# Patient Record
Sex: MALE | Race: WHITE
[De-identification: names, ages, dates, MRNs, and addresses within clinical notes are randomized per-mention and may not be internally consistent; named-entity substitution may affect disease eponyms.]

---

## 2019-02-20 ENCOUNTER — HOSPITAL ENCOUNTER (EMERGENCY)
Dept: HOSPITAL 26 - MED | Age: 54
Discharge: HOME | End: 2019-02-20
Payer: MEDICAID

## 2019-02-20 VITALS — DIASTOLIC BLOOD PRESSURE: 103 MMHG | SYSTOLIC BLOOD PRESSURE: 149 MMHG

## 2019-02-20 VITALS — BODY MASS INDEX: 23.78 KG/M2 | WEIGHT: 148 LBS | HEIGHT: 66 IN

## 2019-02-20 VITALS — DIASTOLIC BLOOD PRESSURE: 85 MMHG | SYSTOLIC BLOOD PRESSURE: 157 MMHG

## 2019-02-20 DIAGNOSIS — R10.84: ICD-10-CM

## 2019-02-20 DIAGNOSIS — F10.129: Primary | ICD-10-CM

## 2019-02-20 LAB
ALBUMIN FLD-MCNC: 4.8 G/DL (ref 3.4–5)
ANION GAP SERPL CALCULATED.3IONS-SCNC: 18.5 MMOL/L (ref 8–16)
AST SERPL-CCNC: 147 U/L (ref 15–37)
BARBITURATES UR QL SCN: (no result) NG/ML
BASOPHILS # BLD AUTO: 0.1 K/UL (ref 0–0.22)
BASOPHILS NFR BLD AUTO: 1.2 % (ref 0–2)
BENZODIAZ UR QL SCN: (no result) NG/ML
BILIRUB SERPL-MCNC: 0.8 MG/DL (ref 0–1)
BUN SERPL-MCNC: 7 MG/DL (ref 7–18)
BZE UR QL SCN: (no result) NG/ML
CANNABINOIDS UR QL SCN: (no result) NG/ML
CHLORIDE SERPL-SCNC: 99 MMOL/L (ref 98–107)
CO2 SERPL-SCNC: 28.1 MMOL/L (ref 21–32)
CREAT SERPL-MCNC: 0.8 MG/DL (ref 0.7–1.3)
EOSINOPHIL # BLD AUTO: 0 K/UL (ref 0–0.4)
EOSINOPHIL NFR BLD AUTO: 0.2 % (ref 0–4)
ERYTHROCYTE [DISTWIDTH] IN BLOOD BY AUTOMATED COUNT: 14.7 % (ref 11.6–13.7)
GFR SERPL CREATININE-BSD FRML MDRD: 129 ML/MIN (ref 90–?)
GLUCOSE SERPL-MCNC: 111 MG/DL (ref 74–106)
HCT VFR BLD AUTO: 50.9 % (ref 36–52)
HGB BLD-MCNC: 17.1 G/DL (ref 12–18)
LIPASE SERPL-CCNC: 229 U/L (ref 73–393)
LYMPHOCYTES # BLD AUTO: 0.9 K/UL (ref 2–11.5)
LYMPHOCYTES NFR BLD AUTO: 19.2 % (ref 20.5–51.1)
MCH RBC QN AUTO: 31 PG (ref 27–31)
MCHC RBC AUTO-ENTMCNC: 34 G/DL (ref 33–37)
MCV RBC AUTO: 92.9 FL (ref 80–94)
MONOCYTES # BLD AUTO: 0.4 K/UL (ref 0.8–1)
MONOCYTES NFR BLD AUTO: 8.3 % (ref 1.7–9.3)
NEUTROPHILS # BLD AUTO: 3.4 K/UL (ref 1.8–7.7)
NEUTROPHILS NFR BLD AUTO: 71.1 % (ref 42.2–75.2)
OPIATES UR QL SCN: (no result) NG/ML
PCP UR QL SCN: (no result) NG/ML
PLATELET # BLD AUTO: 163 K/UL (ref 140–450)
POTASSIUM SERPL-SCNC: 3.6 MMOL/L (ref 3.5–5.1)
RBC # BLD AUTO: 5.48 MIL/UL (ref 4.2–6.1)
SODIUM SERPL-SCNC: 142 MMOL/L (ref 136–145)
WBC # BLD AUTO: 4.8 K/UL (ref 4.8–10.8)

## 2019-02-20 PROCEDURE — G0482 DRUG TEST DEF 15-21 CLASSES: HCPCS

## 2019-02-20 PROCEDURE — 83690 ASSAY OF LIPASE: CPT

## 2019-02-20 PROCEDURE — 36415 COLL VENOUS BLD VENIPUNCTURE: CPT

## 2019-02-20 PROCEDURE — 99283 EMERGENCY DEPT VISIT LOW MDM: CPT

## 2019-02-20 PROCEDURE — 85025 COMPLETE CBC W/AUTO DIFF WBC: CPT

## 2019-02-20 PROCEDURE — 80053 COMPREHEN METABOLIC PANEL: CPT

## 2019-02-20 PROCEDURE — 80305 DRUG TEST PRSMV DIR OPT OBS: CPT

## 2019-02-20 NOTE — NUR
53/M BIB SON FOR ETOH INTOXICATION. PT HAS BEEN DRINKING 3-4 BEERS FOR 2 WEEKS. 
PT REPORTS 4/10 DIFFUSE ABD PAIN. DENIES FEVER, NAUSEA, VOMITING, DIARRHEA, 
CONSTIPATION. PT AOX4, GCS 14, WITH MILD CONFUSION, AMBULATORY, RR EVEN AND 
UNLABORED. LUNG SOUNDS CLEAR BL. BS ACTIVE X4, ABD SOFT ROUND NONTENDER.

## 2019-03-03 ENCOUNTER — HOSPITAL ENCOUNTER (EMERGENCY)
Dept: HOSPITAL 1 - ED | Age: 54
LOS: 1 days | Discharge: HOME | End: 2019-03-04
Payer: MEDICAID

## 2019-03-03 VITALS
HEIGHT: 66 IN | HEIGHT: 66 IN | BODY MASS INDEX: 31.82 KG/M2 | WEIGHT: 198 LBS | WEIGHT: 198 LBS | BODY MASS INDEX: 31.82 KG/M2

## 2019-03-03 DIAGNOSIS — F10.129: Primary | ICD-10-CM

## 2019-03-03 DIAGNOSIS — R63.0: ICD-10-CM

## 2019-03-03 LAB
ALBUMIN SERPL-MCNC: 4.4 G/DL (ref 3.4–5)
ALP SERPL-CCNC: 100 U/L (ref 46–116)
ALT SERPL-CCNC: 128 U/L (ref 16–63)
AST SERPL-CCNC: 194 U/L (ref 15–37)
BASOPHILS NFR BLD: 0.7 % (ref 0–2)
BILIRUB SERPL-MCNC: 1.03 MG/DL (ref 0.2–1)
BUN SERPL-MCNC: 6 MG/DL (ref 7–18)
CALCIUM SERPL-MCNC: 8.1 MG/DL (ref 8.5–10.1)
CHLORIDE SERPL-SCNC: 99 MMOL/L (ref 98–107)
CO2 SERPL-SCNC: 31.9 MMOL/L (ref 21–32)
CREAT SERPL-MCNC: 0.8 MG/DL (ref 0.7–1.3)
ERYTHROCYTE [DISTWIDTH] IN BLOOD BY AUTOMATED COUNT: 15.5 % (ref 11.5–14.5)
GFR SERPLBLD BASED ON 1.73 SQ M-ARVRAT: > 60 ML/MIN
GLUCOSE SERPL-MCNC: 113 MG/DL (ref 74–106)
PLATELET # BLD: 152 X10^3MCL (ref 130–400)
POTASSIUM SERPL-SCNC: 3.4 MMOL/L (ref 3.5–5.1)
PROT SERPL-MCNC: 8.9 G/DL (ref 6.4–8.2)
SODIUM SERPL-SCNC: 142 MMOL/L (ref 136–145)

## 2019-03-03 PROCEDURE — G0480 DRUG TEST DEF 1-7 CLASSES: HCPCS

## 2019-03-04 VITALS — SYSTOLIC BLOOD PRESSURE: 115 MMHG | DIASTOLIC BLOOD PRESSURE: 82 MMHG

## 2019-03-07 ENCOUNTER — HOSPITAL ENCOUNTER (INPATIENT)
Dept: HOSPITAL 26 - MED | Age: 54
LOS: 4 days | Discharge: HOME | End: 2019-03-11
Attending: GENERAL PRACTICE | Admitting: GENERAL PRACTICE
Payer: MEDICAID

## 2019-03-07 VITALS — DIASTOLIC BLOOD PRESSURE: 81 MMHG | SYSTOLIC BLOOD PRESSURE: 143 MMHG

## 2019-03-07 VITALS — WEIGHT: 147 LBS | BODY MASS INDEX: 23.07 KG/M2 | HEIGHT: 67 IN

## 2019-03-07 DIAGNOSIS — E78.5: ICD-10-CM

## 2019-03-07 DIAGNOSIS — E78.00: ICD-10-CM

## 2019-03-07 DIAGNOSIS — M19.011: ICD-10-CM

## 2019-03-07 DIAGNOSIS — F10.129: Primary | ICD-10-CM

## 2019-03-07 DIAGNOSIS — D72.819: ICD-10-CM

## 2019-03-07 DIAGNOSIS — M19.012: ICD-10-CM

## 2019-03-07 DIAGNOSIS — K80.20: ICD-10-CM

## 2019-03-07 DIAGNOSIS — R79.89: ICD-10-CM

## 2019-03-07 DIAGNOSIS — Z71.41: ICD-10-CM

## 2019-03-07 DIAGNOSIS — K85.90: ICD-10-CM

## 2019-03-07 DIAGNOSIS — E87.6: ICD-10-CM

## 2019-03-07 DIAGNOSIS — K76.9: ICD-10-CM
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
## 2019-03-07 DIAGNOSIS — G92: ICD-10-CM

## 2019-03-07 DIAGNOSIS — E83.51: ICD-10-CM

## 2019-03-07 LAB
ALBUMIN FLD-MCNC: 3.9 G/DL (ref 3.4–5)
ANION GAP SERPL CALCULATED.3IONS-SCNC: 21.7 MMOL/L (ref 8–16)
APAP SERPL-MCNC: < 0.5 UG/ML (ref 10–30)
AST SERPL-CCNC: 212 U/L (ref 15–37)
BASOPHILS # BLD AUTO: 0.1 K/UL (ref 0–0.22)
BASOPHILS NFR BLD AUTO: 2.1 % (ref 0–2)
BILIRUB SERPL-MCNC: 1.5 MG/DL (ref 0–1)
BUN SERPL-MCNC: 17 MG/DL (ref 7–18)
CHLORIDE SERPL-SCNC: 98 MMOL/L (ref 98–107)
CO2 SERPL-SCNC: 24.6 MMOL/L (ref 21–32)
CREAT SERPL-MCNC: 0.8 MG/DL (ref 0.7–1.3)
EOSINOPHIL # BLD AUTO: 0 K/UL (ref 0–0.4)
EOSINOPHIL NFR BLD AUTO: 0.2 % (ref 0–4)
ERYTHROCYTE [DISTWIDTH] IN BLOOD BY AUTOMATED COUNT: 15.5 % (ref 11.6–13.7)
GFR SERPL CREATININE-BSD FRML MDRD: 129 ML/MIN (ref 90–?)
GLUCOSE SERPL-MCNC: 81 MG/DL (ref 74–106)
HCT VFR BLD AUTO: 46.5 % (ref 36–52)
HGB BLD-MCNC: 15.9 G/DL (ref 12–18)
LYMPHOCYTES # BLD AUTO: 0.8 K/UL (ref 2–11.5)
LYMPHOCYTES NFR BLD AUTO: 24.9 % (ref 20.5–51.1)
MCH RBC QN AUTO: 32 PG (ref 27–31)
MCHC RBC AUTO-ENTMCNC: 34 G/DL (ref 33–37)
MCV RBC AUTO: 93.4 FL (ref 80–94)
MONOCYTES # BLD AUTO: 0.4 K/UL (ref 0.8–1)
MONOCYTES NFR BLD AUTO: 13.3 % (ref 1.7–9.3)
NEUTROPHILS # BLD AUTO: 1.8 K/UL (ref 1.8–7.7)
NEUTROPHILS NFR BLD AUTO: 59.5 % (ref 42.2–75.2)
PLATELET # BLD AUTO: 128 K/UL (ref 140–450)
POTASSIUM SERPL-SCNC: 3.3 MMOL/L (ref 3.5–5.1)
RBC # BLD AUTO: 4.98 MIL/UL (ref 4.2–6.1)
SALICYLATES SERPL-MCNC: < 2.8 MG/DL (ref 2.8–20)
SODIUM SERPL-SCNC: 141 MMOL/L (ref 136–145)
WBC # BLD AUTO: 3.1 K/UL (ref 4.8–10.8)

## 2019-03-07 PROCEDURE — G0480 DRUG TEST DEF 1-7 CLASSES: HCPCS

## 2019-03-07 PROCEDURE — G0482 DRUG TEST DEF 15-21 CLASSES: HCPCS

## 2019-03-07 PROCEDURE — A9153 MULTI-VITAMIN NOS: HCPCS

## 2019-03-07 NOTE — NUR
PATIENT PRESENTS TO ED WITH UNDER THE INFLUENCE OF [place] SUBSTANCE. AAO X2 TO 
NAME AND BIRTHDATE.DENIES N/V/D; SKIN IS PINK/WARM/DRY;LUNGS CLEAR BL; HR EVEN 
AND REGULAR; PT DENIES ANY FEVER, CP, SOB, OR COUGH AT THIS TIME; PATIENT 
STATES PAIN OF 0/10 AT THIS TIME; VSS; PATIENT POSITIONED FOR COMFORT; HOB 
ELEVATED; BEDRAILS UP X2; BED DOWN. ER MD MADE AWARE OF PT STATUS.

## 2019-03-08 VITALS — DIASTOLIC BLOOD PRESSURE: 84 MMHG | SYSTOLIC BLOOD PRESSURE: 141 MMHG

## 2019-03-08 VITALS — SYSTOLIC BLOOD PRESSURE: 143 MMHG | DIASTOLIC BLOOD PRESSURE: 83 MMHG

## 2019-03-08 VITALS — DIASTOLIC BLOOD PRESSURE: 79 MMHG | SYSTOLIC BLOOD PRESSURE: 132 MMHG

## 2019-03-08 LAB
AMYLASE SERPL-CCNC: 153 U/L (ref 25–115)
ANION GAP SERPL CALCULATED.3IONS-SCNC: 23.8 MMOL/L (ref 8–16)
APPEARANCE UR: CLEAR
BARBITURATES UR QL SCN: (no result) NG/ML
BASOPHILS # BLD AUTO: 0.1 K/UL (ref 0–0.22)
BASOPHILS NFR BLD AUTO: 2.3 % (ref 0–2)
BENZODIAZ UR QL SCN: (no result) NG/ML
BILIRUB UR QL STRIP: (no result)
BUN SERPL-MCNC: 15 MG/DL (ref 7–18)
BZE UR QL SCN: (no result) NG/ML
CANNABINOIDS UR QL SCN: (no result) NG/ML
CHLORIDE SERPL-SCNC: 101 MMOL/L (ref 98–107)
CHOLEST/HDLC SERPL: 2.3 {RATIO} (ref 1–4.5)
CO2 SERPL-SCNC: 21.7 MMOL/L (ref 21–32)
COLOR UR: (no result)
CREAT SERPL-MCNC: 0.7 MG/DL (ref 0.7–1.3)
EOSINOPHIL # BLD AUTO: 0 K/UL (ref 0–0.4)
EOSINOPHIL NFR BLD AUTO: 0.2 % (ref 0–4)
ERYTHROCYTE [DISTWIDTH] IN BLOOD BY AUTOMATED COUNT: 16 % (ref 11.6–13.7)
GFR SERPL CREATININE-BSD FRML MDRD: 151 ML/MIN (ref 90–?)
GLUCOSE SERPL-MCNC: 72 MG/DL (ref 74–106)
GLUCOSE UR STRIP-MCNC: (no result) MG/DL
HCT VFR BLD AUTO: 44 % (ref 36–52)
HDLC SERPL-MCNC: 115 MG/DL (ref 40–60)
HGB BLD-MCNC: 15 G/DL (ref 12–18)
HGB UR QL STRIP: (no result)
LDLC SERPL CALC-MCNC: 123 MG/DL (ref 60–100)
LEUKOCYTE ESTERASE UR QL STRIP: NEGATIVE
LIPASE SERPL-CCNC: 798 U/L (ref 73–393)
LYMPHOCYTES # BLD AUTO: 0.5 K/UL (ref 2–11.5)
LYMPHOCYTES NFR BLD AUTO: 14.9 % (ref 20.5–51.1)
MAGNESIUM SERPL-MCNC: 2 MG/DL (ref 1.8–2.4)
MAGNESIUM SERPL-MCNC: 2.1 MG/DL (ref 1.8–2.4)
MCH RBC QN AUTO: 32 PG (ref 27–31)
MCHC RBC AUTO-ENTMCNC: 34 G/DL (ref 33–37)
MCV RBC AUTO: 93.1 FL (ref 80–94)
MONOCYTES # BLD AUTO: 0.3 K/UL (ref 0.8–1)
MONOCYTES NFR BLD AUTO: 10.6 % (ref 1.7–9.3)
NEUTROPHILS # BLD AUTO: 2.2 K/UL (ref 1.8–7.7)
NEUTROPHILS NFR BLD AUTO: 72 % (ref 42.2–75.2)
NITRITE UR QL STRIP: NEGATIVE
OPIATES UR QL SCN: (no result) NG/ML
PCP UR QL SCN: (no result) NG/ML
PH UR STRIP: 6 [PH] (ref 5–9)
PHOSPHATE SERPL-MCNC: 4.1 MG/DL (ref 2.5–4.9)
PHOSPHATE SERPL-MCNC: 4.2 MG/DL (ref 2.5–4.9)
PLATELET # BLD AUTO: 124 K/UL (ref 140–450)
POTASSIUM SERPL-SCNC: 3.5 MMOL/L (ref 3.5–5.1)
PROTHROMBIN TIME: 10.1 SECS (ref 10.8–13.4)
RBC # BLD AUTO: 4.73 MIL/UL (ref 4.2–6.1)
RBC #/AREA URNS HPF: (no result) /HPF (ref 0–5)
SODIUM SERPL-SCNC: 143 MMOL/L (ref 136–145)
TRIGL SERPL-MCNC: 108 MG/DL (ref 30–150)
TSH SERPL DL<=0.05 MIU/L-ACNC: 0.58 UIU/ML (ref 0.34–3.74)
WBC # BLD AUTO: 3 K/UL (ref 4.8–10.8)
WBC,URINE: (no result) /HPF (ref 0–5)

## 2019-03-08 RX ADMIN — SODIUM CHLORIDE PRN MG: 9 INJECTION, SOLUTION INTRAVENOUS at 13:07

## 2019-03-08 RX ADMIN — SODIUM CHLORIDE SCH MLS/HR: 9 INJECTION, SOLUTION INTRAVENOUS at 15:04

## 2019-03-08 NOTE — NUR
ASSISTED CNA AND PROVIDED A SPONGE BAHT TO PATIENT. INSTRUCTED PT TO USE THE BEDSIDE URANAL 
AND PRESS THE CALL LIGHT FOR ANY ASSISTANCE. SAFETY MEASURES IN PLACE.

## 2019-03-08 NOTE — NUR
RECEIVED REPORT FROM LIZBETH RN NIGHT SHIFT NURSE FROM ER. PT BROUGHT UP BY AJITH, AND PT WAS 
TRANSFERRED TO ROOM 124A. HE IS  AOX2 AND Congolese SPEAKING ONLY. PT REPORTS NO OTHER MEDICAL 
HX EXCEPT ETOH ABUSE AND SEIZURES. PT SAID THAT HE HAD BEEN DRINKING 3-4 LARGE 24OZ BEERS X 
2 WEEKS. V/S AS FOLLOWS T 97.4 P 74 R 18 B/P 124/68 02 93% ON ROOM AIR.

## 2019-03-08 NOTE — NUR
DR. DIXON EVALUATING PT AT BEDSIDE. NEW ORDERED NOTED FOR N/S TO RUNN AT 100MLS/HR. IN 
PROGRESS. PT HAS R AC 20GUAGE, FLUSHED PATENET. AND RUNNING NS AS ORDERED. PT IN LOW BED 
WITH SIDE RAILS UP X2 HE IS ALERT TO NAME, AND HAS NO FURTHER C/O VOICED. CALL BELL IN 
REACH. PT ALSO HAS SEIZURE PRECAUTIONS IN PLACE.

## 2019-03-08 NOTE — NUR
PT IS EATING DINNER. ABLE TO SELF-FEED. MINIMAL TREMOR NOTED. DENIES OF PAIN. NO SIGNS OF 
DISTRESS NOTED.

## 2019-03-08 NOTE — NUR
ENDORSED PATIENT AT BEDSIDE TO NIGHT SHIFT NURSE FOR CONTINUITY OF CARE. PT IS IN STABLE 
CONDITION.

## 2019-03-08 NOTE — NUR
PT HAS ORDER FOR BANANA BAG.WE HAVE NO [facility].I CHECKED [facility] UNIT AND ICU AND 
ER.SO CALLED DR BLOOM RESIDENT AND ASKED HIM DO YOU WANT TO MIX BANANA BAG WITHOUT THIAMIN OR 
DO YOU WANT TO WAIT UNTIL AM THAT PHARMACY MAKE IT.HE SAID WAIT UNTIL AM.INFORMED KARI LANZA 
PT'S NURSE.

-------------------------------------------------------------------------------

Addendum: 03/11/19 at 2034 by Haley Cole RN

-------------------------------------------------------------------------------

PLEASE AMEND PT HAS ORDER 
FOR....................................................................HE SAID WAIT UNTIL 
AM.

## 2019-03-08 NOTE — NUR
SEIZURES/TREMORS NOTED WHILE WITH BEDPAN IN THE BED. PT COULD NOT LIFT HIS PELVIS UP. 
UNCOORDINATED MVTS. GIVEN ATIVAN AS ORDERED PRN

## 2019-03-08 NOTE — NUR
Patient will be admitted to care of DR MEZA. Admited to [facility].  Will go to room 
124A. Belongings list completed.  Report to KARI LANZA .

## 2019-03-08 NOTE — NUR
RECEIVED BEDSIDE REPORT FROM AM SHIFT NURSE. PT IS AOX2, TO NAME AND PLACE.DX: ALCOHOL 
INTOXICATION. DENIES PAIN. ABLE TO MAKE NEED KNOWN AND FOLLOW SIMPLE COMMANDS. ABLE TO 
SELF-REPOSITIONING. RESPIRATION IS EVEN AND UNLABORED. ON RA. NO SIGNS OF DISTRESS NOTED. IV 
ON RAC 20G, PATENT AND ASYMPTOMATIC, INFUSING PER MD ORDER. SKIN INTACT AND DRY. UNABLE TO 
AMBULATE WITH STEADY GAIT. URINAL IS AT BEDSIDE. DISCUSSED PLAN OF CARE WITH PT. FALL 
PRECAUTIONS AND SEIZURE PRECAUTION INITIALED. BED IN LOW POSITION, CALL LIGHT WITHIN REACH.

## 2019-03-08 NOTE — NUR
PT IS FEELING RESTLESS AND IRRITABLE.  MINIMAL TREMOR NOTED ON BOTH HANDS. ADMINISTERED PRN 
ATIVAN AS PER MD ORDER.

## 2019-03-08 NOTE — NUR
RECEIVED BEDSIDE REPORT FROM NIGHT SHIFT NURSE. PT IS AOX2, TO NAME AND PLACE. DENIES PAIN. 
ABLE TO MAKE NEED KNOWN AND FOLLOW SIMPLE COMMANDS. ABLE TO SELF-REPOSITIONING. RESPIRATION 
IS EVEN AND UNLABORED. RA. NO SIGNS OF DISTRESS NOTED. IV ON RAC 20G, PATENT AND 
ASYMPTOMATIC, INFUSING PER MD ORDER. SKIN INTACT AND DRY. UNABLE TO AMBULATE WITH STEADY 
GAIT. URANAL IS AT BEDSIDE. DISCUSSED PLAN OF CARE WITH PT, PT NODDED HIS HEAD. FALL 
PRECAUTIONS AND SEIZURE PRECAUTION INITIALED. BED IN LOW POSITION, CALL LIGHT WITHIN REACH.

## 2019-03-09 VITALS — SYSTOLIC BLOOD PRESSURE: 141 MMHG | DIASTOLIC BLOOD PRESSURE: 84 MMHG

## 2019-03-09 VITALS — SYSTOLIC BLOOD PRESSURE: 153 MMHG | DIASTOLIC BLOOD PRESSURE: 80 MMHG

## 2019-03-09 LAB
ANION GAP SERPL CALCULATED.3IONS-SCNC: 17.7 MMOL/L (ref 8–16)
BASOPHILS # BLD AUTO: 0.1 K/UL (ref 0–0.22)
BASOPHILS NFR BLD AUTO: 1 % (ref 0–2)
BUN SERPL-MCNC: 9 MG/DL (ref 7–18)
CHLORIDE SERPL-SCNC: 97 MMOL/L (ref 98–107)
CHOLEST/HDLC SERPL: 2 {RATIO} (ref 1–4.5)
CO2 SERPL-SCNC: 26.4 MMOL/L (ref 21–32)
CREAT SERPL-MCNC: 0.8 MG/DL (ref 0.7–1.3)
EOSINOPHIL # BLD AUTO: 0 K/UL (ref 0–0.4)
EOSINOPHIL NFR BLD AUTO: 0.8 % (ref 0–4)
ERYTHROCYTE [DISTWIDTH] IN BLOOD BY AUTOMATED COUNT: 15.8 % (ref 11.6–13.7)
GFR SERPL CREATININE-BSD FRML MDRD: 129 ML/MIN (ref 90–?)
GLUCOSE SERPL-MCNC: 82 MG/DL (ref 74–106)
HCT VFR BLD AUTO: 39 % (ref 36–52)
HDLC SERPL-MCNC: 105 MG/DL (ref 40–60)
HGB BLD-MCNC: 13.3 G/DL (ref 12–18)
LDLC SERPL CALC-MCNC: 93 MG/DL (ref 60–100)
LYMPHOCYTES # BLD AUTO: 0.6 K/UL (ref 2–11.5)
LYMPHOCYTES NFR BLD AUTO: 11.1 % (ref 20.5–51.1)
MCH RBC QN AUTO: 32 PG (ref 27–31)
MCHC RBC AUTO-ENTMCNC: 34 G/DL (ref 33–37)
MCV RBC AUTO: 93.7 FL (ref 80–94)
MONOCYTES # BLD AUTO: 0.5 K/UL (ref 0.8–1)
MONOCYTES NFR BLD AUTO: 9.4 % (ref 1.7–9.3)
NEUTROPHILS # BLD AUTO: 4.2 K/UL (ref 1.8–7.7)
NEUTROPHILS NFR BLD AUTO: 77.7 % (ref 42.2–75.2)
PLATELET # BLD AUTO: 104 K/UL (ref 140–450)
POTASSIUM SERPL-SCNC: 3.1 MMOL/L (ref 3.5–5.1)
RBC # BLD AUTO: 4.16 MIL/UL (ref 4.2–6.1)
SODIUM SERPL-SCNC: 138 MMOL/L (ref 136–145)
TRIGL SERPL-MCNC: 40 MG/DL (ref 30–150)
WBC # BLD AUTO: 5.4 K/UL (ref 4.8–10.8)

## 2019-03-09 RX ADMIN — SODIUM CHLORIDE SCH MLS/HR: 9 INJECTION, SOLUTION INTRAVENOUS at 20:40

## 2019-03-09 RX ADMIN — HYDROCODONE BITARTRATE AND ACETAMINOPHEN PRN TAB: 7.5; 325 TABLET ORAL at 20:53

## 2019-03-09 RX ADMIN — SODIUM CHLORIDE SCH MLS/HR: 9 INJECTION, SOLUTION INTRAVENOUS at 00:40

## 2019-03-09 RX ADMIN — SODIUM CHLORIDE SCH MLS/HR: 9 INJECTION, SOLUTION INTRAVENOUS at 17:33

## 2019-03-09 RX ADMIN — SODIUM CHLORIDE PRN MG: 9 INJECTION, SOLUTION INTRAVENOUS at 17:39

## 2019-03-09 RX ADMIN — ATORVASTATIN CALCIUM SCH MG: 20 TABLET, FILM COATED ORAL at 17:32

## 2019-03-09 NOTE — NUR
RECEIVED BEDSIDE REPORT FROM NIGHT SHIFT NURSE. PT IS AOX2, TO NAME AND PLACE. DENIES PAIN. 
ABLE TO MAKE NEED KNOWN AND FOLLOW SIMPLE COMMANDS. ABLE TO SELF-REPOSITIONING. RESPIRATION 
IS EVEN AND UNLABORED. RA. MINIMAL TREMOR NOTED WHILE PT IS MOVING HIS HANDS. NO SIGNS OF 
DISTRESS NOTED. IV ON RAC 20G, PATENT AND ASYMPTOMATIC, INFUSING PER MD ORDER. SKIN INTACT 
AND DRY. UNABLE TO AMBULATE; UNSTEADY GAIT. URANAL IS AT BEDSIDE. DISCUSSED PLAN OF CARE 
WITH PT. FALL PRECAUTIONS AND SEIZURE PRECAUTION INITIALED. BED IN LOW POSITION, CALL LIGHT 
WITHIN REACH.

## 2019-03-09 NOTE — NUR
PATIENT ATTEMPT TO GET UP. INSTRUCTED TO USE CALL LIGHT. PATIENT ABLE TO VERBALIZED 
UNDERSTANDING. BED ALARM ON. WILL CONTINUE TO MONITOR.

## 2019-03-09 NOTE — NUR
PT'S BROTHER IN [place] IS AT BEDSIDE. PT IS TALKING TO HIS BROTHER IN [place]. NO SIGNS OF 
DISTRESS NOTED.

## 2019-03-09 NOTE — NUR
PT IS RESTING ON BED. REQUESTED FOR AN EXTRA BLANKET. PROVIDED. NO SIGNS OF DISTRESS NOTED. 
SAFETY MEASURES ARE IN PLACE.

## 2019-03-09 NOTE — NUR
ADMINISTERED POTASSIUM AS PER MD ORDER, PT'S POTASSIUM LEVEL IS 3.1 FROM AM LAB. PT IS 
SITTING UP AND EATING LUNCH ON BED. DENIES PAIN. NO SIGNS OF DISTRESS NOTED.

## 2019-03-09 NOTE — NUR
ADMINISTERED MED AS PER MD ORDER. PT HAS MINIMAL TREMOR WHILE HE IS MOVING HIS HANDS. DENIES 
PAIN. BROTHER KRISSY IS AT BEDSIDE.

## 2019-03-09 NOTE — NUR
PATIENT AWAKE, ALERT, RESPIRATION EVEN UNLABORED ON ROOM AIR. COMPLAINED OF 6/10 ABDOMEN 
PAIN . PRN PAIN MED ADMINISTERED PER ORDER. WILL CONTINUE TO MONITOR.

## 2019-03-09 NOTE — NUR
RECEIVED BEDSIDE REPORT FROM DAY SHIFT NURSE FOR CONTINUITY OF CARE. PATIENT IS AWAKE, AOX2 
TO NAME AND PLACE. RESPIRATION EVEN UNLABORED ON ROOM AIR. DENIES PAIN. SKIN IS WARM AND 
DRY. IV PATENT AND INTACT. PLAN OF CARE WAS DISCUSSED. FAMILY MEMBER AT BEDSIDE. ALL SAFETY 
MEASURES ARE IN PLACE. FALL RISK PROTOCOL IN PLACE. SIDE RAILS PADDED FOR SEIZURE 
PRECAUTION. URINAL PAN WITHIN REACH. BED IS AT LOW POSITION. CALL LIGHT WITHIN REACH.

## 2019-03-10 VITALS — DIASTOLIC BLOOD PRESSURE: 82 MMHG | SYSTOLIC BLOOD PRESSURE: 129 MMHG

## 2019-03-10 VITALS — DIASTOLIC BLOOD PRESSURE: 85 MMHG | SYSTOLIC BLOOD PRESSURE: 146 MMHG

## 2019-03-10 VITALS — SYSTOLIC BLOOD PRESSURE: 132 MMHG | DIASTOLIC BLOOD PRESSURE: 99 MMHG

## 2019-03-10 LAB
ANION GAP SERPL CALCULATED.3IONS-SCNC: 12.7 MMOL/L (ref 8–16)
BASOPHILS # BLD AUTO: 0.1 K/UL (ref 0–0.22)
BASOPHILS NFR BLD AUTO: 1.4 % (ref 0–2)
BUN SERPL-MCNC: 9 MG/DL (ref 7–18)
CHLORIDE SERPL-SCNC: 99 MMOL/L (ref 98–107)
CO2 SERPL-SCNC: 30.4 MMOL/L (ref 21–32)
CREAT SERPL-MCNC: 0.6 MG/DL (ref 0.7–1.3)
EOSINOPHIL # BLD AUTO: 0.1 K/UL (ref 0–0.4)
EOSINOPHIL NFR BLD AUTO: 2.5 % (ref 0–4)
ERYTHROCYTE [DISTWIDTH] IN BLOOD BY AUTOMATED COUNT: 16 % (ref 11.6–13.7)
GFR SERPL CREATININE-BSD FRML MDRD: 180 ML/MIN (ref 90–?)
GLUCOSE SERPL-MCNC: 93 MG/DL (ref 74–106)
HCT VFR BLD AUTO: 40.9 % (ref 36–52)
HGB BLD-MCNC: 13.9 G/DL (ref 12–18)
LYMPHOCYTES # BLD AUTO: 0.7 K/UL (ref 2–11.5)
LYMPHOCYTES NFR BLD AUTO: 15.6 % (ref 20.5–51.1)
MAGNESIUM SERPL-MCNC: 1.8 MG/DL (ref 1.8–2.4)
MCH RBC QN AUTO: 32 PG (ref 27–31)
MCHC RBC AUTO-ENTMCNC: 34 G/DL (ref 33–37)
MCV RBC AUTO: 93.5 FL (ref 80–94)
MONOCYTES # BLD AUTO: 0.5 K/UL (ref 0.8–1)
MONOCYTES NFR BLD AUTO: 10 % (ref 1.7–9.3)
NEUTROPHILS # BLD AUTO: 3.2 K/UL (ref 1.8–7.7)
NEUTROPHILS NFR BLD AUTO: 70.5 % (ref 42.2–75.2)
PHOSPHATE SERPL-MCNC: 2.5 MG/DL (ref 2.5–4.9)
PLATELET # BLD AUTO: 101 K/UL (ref 140–450)
POTASSIUM SERPL-SCNC: 3.1 MMOL/L (ref 3.5–5.1)
RBC # BLD AUTO: 4.37 MIL/UL (ref 4.2–6.1)
SODIUM SERPL-SCNC: 139 MMOL/L (ref 136–145)
WBC # BLD AUTO: 4.6 K/UL (ref 4.8–10.8)

## 2019-03-10 RX ADMIN — SODIUM CHLORIDE SCH MLS/HR: 9 INJECTION, SOLUTION INTRAVENOUS at 15:40

## 2019-03-10 RX ADMIN — SODIUM CHLORIDE SCH MLS/HR: 9 INJECTION, SOLUTION INTRAVENOUS at 10:31

## 2019-03-10 RX ADMIN — SODIUM CHLORIDE SCH MLS/HR: 9 INJECTION, SOLUTION INTRAVENOUS at 08:38

## 2019-03-10 RX ADMIN — ATORVASTATIN CALCIUM SCH MG: 20 TABLET, FILM COATED ORAL at 16:25

## 2019-03-10 RX ADMIN — INFLUENZA A VIRUS A/MICHIGAN/45/2015 X-275 (H1N1) ANTIGEN (FORMALDEHYDE INACTIVATED), INFLUENZA A VIRUS A/SINGAPORE/INFIMH-16-0019/2016 IVR-186 (H3N2) ANTIGEN (FORMALDEHYDE INACTIVATED), INFLUENZA B VIRUS B/PHUKET/3073/2013 ANTIGEN (FORMALDEHYDE INACTIVATED), AND INFLUENZA B VIRUS B/MARYLAND/15/2016 BX-69A ANTIGEN (FORMALDEHYDE INACTIVATED) PRN ML: 15; 15; 15; 15 INJECTION, SUSPENSION INTRAMUSCULAR at 17:55

## 2019-03-10 NOTE — NUR
SCHEDULED MEDICATION GIVEN, PT IS ABLE TO SWALLOW PILLS WHOLE, NO ADVERSE EFFECTS NOTED. 
PT'S SON KRISSY AT BEDSIDE, PHONE NUMBER OBTAINED [phone],  DR. HEALY CAME IN, 
QUESTIONS HAS BEEN ANSWERED.

## 2019-03-10 NOTE — NUR
PATIENT WAS CONFUSED TRY TO GET UP AND PULLED HIS IV. NO ACTIVE BLEEDING SEEN. IV CANNULA 
INTACT. INSERTED IV NEW TO THE LEFT FOREARM 24G. WILL CONTINUE TO MONITOR.

## 2019-03-10 NOTE — NUR
PATIENT WATCHING TV IN BED. RESPIRATION EVEN UNLABORED ON ROOM AIR. NO DISTRESS NOTED. CALL 
LIGHT WITHIN REACH. WILL CONTINUE TO MONITOR

## 2019-03-10 NOTE — NUR
NO CHANGE OF CONDITION AT THIS TIME, PT IS IN BED, NO S/S OF DISTRESS, DENIES PAIN, ABLE TO 
EAT LUNCH SELF WITHOUT ASSIST.

## 2019-03-10 NOTE — NUR
PATIENT AWAKE RESPIRATION EVEN UNLABORED ON ROOM AIR. DENIES PAIN. INITIAL ASSESSMENT DONE. 
VITALS WERE TAKEN. CALL LIGHT WITHIN REACH. WILL CONTINUE TO MONITOR.

## 2019-03-10 NOTE — NUR
PATIENT SLEEPING COMFORTABLY RESPIRATION EVEN UNLABORED ON ROOM AIR. IV PATENT AND INTACT. 
WILL CONTINUE TO MONITOR.

## 2019-03-10 NOTE — NUR
RECEIVED REPORT FROM NIGHT SHIFT RN AT BEDSIDE, PT IS AAOX4, Ukrainian SPEAKING ONLY, ABLE TO 
FOLLOW COMMANDS, DENIES PAIN, VSS, NO S/S OF DISTRESS, CLEAR LUNG SOUNDS TARA, ON RA. DENIES 
CHEST PAIN,  ROUND SOFT ABDOMEN WITH ACTIVE BOWEL SOUNDS, CONTINENT B&B'S, SLIGHT WEAKNESS 
TO BLE, SKIN IS INTACT, WARM AND DRY TO TOUCH,  IV SITE TO LEFT FOREARM,22GA, RUNNING NS AT 
100ML/HR. HOB ELEVATED TO 30 DEGREES, SAFETY MEASURES IN PLACE, CALL LIGHT WITHIN REACH, 
WILL CONTINUE TO MONITOR.

## 2019-03-10 NOTE — NUR
RECEIVED BEDSIDE REPORT FROM DAY SHIFT NURSE FOR CONTINUITY OF CARE. PATIENT AWAKE, ALERT, 
RESPIRATION EVEN UNLABORED ON ROOM AIR. DENIES PAIN. SKIN IS WARM AND DRY. IV PATENT AND 
INTACT. PLAN OF CARE WAS DISCUSSED. ALL SAFETY MEASURES ARE IN PLACE. BED IS AT LOW 
POSITION. BED ALARM ON. CALL LIGHT WITHIN REACH. WILL CONTINUE TO MONITOR.

## 2019-03-11 VITALS — SYSTOLIC BLOOD PRESSURE: 133 MMHG | DIASTOLIC BLOOD PRESSURE: 81 MMHG

## 2019-03-11 VITALS — DIASTOLIC BLOOD PRESSURE: 87 MMHG | SYSTOLIC BLOOD PRESSURE: 135 MMHG

## 2019-03-11 RX ADMIN — SODIUM CHLORIDE SCH MLS/HR: 9 INJECTION, SOLUTION INTRAVENOUS at 02:40

## 2019-03-11 RX ADMIN — SODIUM CHLORIDE SCH MLS/HR: 9 INJECTION, SOLUTION INTRAVENOUS at 08:53

## 2019-03-11 RX ADMIN — HYDROCODONE BITARTRATE AND ACETAMINOPHEN PRN TAB: 7.5; 325 TABLET ORAL at 08:52

## 2019-03-11 RX ADMIN — SODIUM CHLORIDE SCH MLS/HR: 9 INJECTION, SOLUTION INTRAVENOUS at 11:10

## 2019-03-11 RX ADMIN — INFLUENZA A VIRUS A/MICHIGAN/45/2015 X-275 (H1N1) ANTIGEN (FORMALDEHYDE INACTIVATED), INFLUENZA A VIRUS A/SINGAPORE/INFIMH-16-0019/2016 IVR-186 (H3N2) ANTIGEN (FORMALDEHYDE INACTIVATED), INFLUENZA B VIRUS B/PHUKET/3073/2013 ANTIGEN (FORMALDEHYDE INACTIVATED), AND INFLUENZA B VIRUS B/MARYLAND/15/2016 BX-69A ANTIGEN (FORMALDEHYDE INACTIVATED) PRN ML: 15; 15; 15; 15 INJECTION, SUSPENSION INTRAMUSCULAR at 13:15

## 2019-03-11 NOTE — NUR
RECEIVED REPORT FROM NIGHT SHIFT NURSE. PATIENT LYING DOWN IN BED SLEEPING, AROUSABLE BY 
VOICE. NO DISTRESS NOTED. DENIES ANY PAIN AT THIS TIME. RESPIRATIONS EVEN, UNLABORED, ON 
ROOM AIR. AAOX2, CALM, COOPERATIVE, SKIN COLOR APPROPRIATE TO ETHNICITY, WARM TO TOUCH. SKIN 
INTACT. IV SITE INTACT, PATENT, AND INFUSING IVF AS PER MD ORDERS. ABDOMEN SOFT, 
NON-DISTENDED. REVIEWED PLAN OF CARE WITH PATIENT. PATIENT VERBALIZED UNDERSTANDING. SAFETY 
MEASURES IN PLACE, CALL LIGHT WITHIN REACH. WILL CONTINUE TO MONITOR.

## 2019-03-11 NOTE — NUR
SON AT BEDSIDE READY TO TAKE PATIENT HOME. IV SITE REMOVED WITH MINIMAL BLOOD AND LUMEN 
COMPLETELY INTACT. ID BANDS REMOVED. PATIENT TO GET DRESSED AND THEN READY TO GO HOME. WILL 
CONTINUE TO MONITOR.

## 2019-03-11 NOTE — NUR
DISCHARGE INSTRUCTIONS PROVIDED TO PATIENT IN PREFERRED LANGUAGE OF Malaysian USING  
PHONE # [phone]. INSTRUCTIONS REGARDING NEW/CHANGED MEDICATION REGIMEN, FOLLOW-UP WITH CLINIC 
WITHIN 3-5 DAYS, DIET REGIMEN, AND ALCOHOL INTOXICATION MANAGEMENT/PREVENTION. ANSWERED ALL 
OF PATIENT'S QUESTIONS REGARDING DISCHARGE. PATIENT VERBALIZED COMPLETE UNDERSTANDING. 
PATIENT AWAITING FOR SON TO ARRIVE AFTER WORK AROUND 1600 TO TAKE HIM HOME. WILL CONTINUE TO 
MONITOR.

## 2019-03-11 NOTE — NUR
PATIENT'S SON AT BEDSIDE READY TO TAKE PATIENT HOME. ALL BELONGINGS WITH PATIENT. PATIENT 
ALL DRESSED. ESCORTED PATIENT DOWN TO LOBBY VIA WHEELCHAIR. PATIENT DISCHARGED TO HOME IN 
PRIVATE VEHICLE IN STABLE CONDITION AT THIS TIME.

## 2019-03-11 NOTE — NUR
PATIENT CONFUSED PULLED HIS IV OUT. NO ACTIVE BLEEDING SEEN. CANNULA INTACT. INSERTED NEW IV 
TO LEFT FOREARM 24G. WILL CONTINUE TO MONITOR.

## 2019-03-11 NOTE — NUR
CHECKED PATIENT. PATIENT SLEEPING RESPIRATION EVEN UNLABORED ON ROOM AIR. NO DISTRESS NOTED. 
CALL LIGHT WITHIN REACH. WILL CONTINUE TO MONITOR

## 2019-03-11 NOTE — NUR
PATIENT ATTEMPT TO GET UP. INSTRUCT TO USE CALL LIGHT FOR HELP. ABLE TO VERBALIZE 
UNDERSTANDING. WILL CONTINUE TO MONITOR.

## 2019-03-11 NOTE — NUR
PATIENT LYING DOWN IN BED WATCHING TV. NO DISTRESS NOTED. DENIES ANY PAIN. SCHEDULED 
MEDICATIONS DUE GIVEN. WILL CONTINUE TO MONITOR.

## 2019-03-11 NOTE — NUR
PATIENT LYING DOWN IN BED. NO DISTRESS NOTED. DENIES ANY PAIN. WAS GOING TO GIVE FLU SHOT, 
HOWEVER, FLU SHOT ALREADY GIVEN ON 03/10/19. NO FLU SHOT GIVEN AT THIS TIME. WILL CONTINUE 
TO MONITOR.

## 2019-03-11 NOTE — NUR
PATIENT SITTING IN BED WITH COMPLAINTS OF PAIN, NORCO GIVEN. OTHER SCHEDULED MEDICATIONS DUE 
GIVEN. WILL CONTINUE TO MONITOR.